# Patient Record
Sex: MALE | Race: AMERICAN INDIAN OR ALASKA NATIVE
[De-identification: names, ages, dates, MRNs, and addresses within clinical notes are randomized per-mention and may not be internally consistent; named-entity substitution may affect disease eponyms.]

---

## 2018-05-15 ENCOUNTER — HOSPITAL ENCOUNTER (OUTPATIENT)
Dept: HOSPITAL 5 - SPVIMAG | Age: 78
Discharge: HOME | End: 2018-05-15
Attending: INTERNAL MEDICINE
Payer: MEDICARE

## 2018-05-15 DIAGNOSIS — M85.862: Primary | ICD-10-CM

## 2018-05-15 DIAGNOSIS — Z96.652: ICD-10-CM

## 2018-05-15 DIAGNOSIS — R60.9: ICD-10-CM

## 2018-05-15 NOTE — XRAY REPORT
XRAY LEFT KNEE 3 VIEWS: 05/15/18 13:39:00



CLINICAL: Arthralgia of left lower leg.



FINDINGS: Status post total joint replacement with normal appearance of 

the prosthesis.  No apparent loosening.  Mild osteopenia.  No fracture 

or dislocation.  Quadriceps and patellar tendon enthesophytes.  

Anterior infrapatellar soft tissue edema.  No joint effusion.  Vascular 

calcifications.



IMPRESSION: Status post total joint replacement with nonspecific soft 

tissue edema.  Quadriceps and patellar tendon enthesopathy.